# Patient Record
Sex: MALE | Race: WHITE | NOT HISPANIC OR LATINO | URBAN - METROPOLITAN AREA
[De-identification: names, ages, dates, MRNs, and addresses within clinical notes are randomized per-mention and may not be internally consistent; named-entity substitution may affect disease eponyms.]

---

## 2017-08-11 ENCOUNTER — OFFICE VISIT - HEALTHEAST (OUTPATIENT)
Dept: FAMILY MEDICINE | Facility: CLINIC | Age: 57
End: 2017-08-11

## 2017-08-11 DIAGNOSIS — Z00.00 PHYSICAL EXAM: ICD-10-CM

## 2017-08-11 DIAGNOSIS — J30.9 ALLERGIC RHINITIS: ICD-10-CM

## 2017-08-11 DIAGNOSIS — E66.3 OVERWEIGHT: ICD-10-CM

## 2017-08-11 ASSESSMENT — MIFFLIN-ST. JEOR: SCORE: 2002.87

## 2017-08-18 ENCOUNTER — AMBULATORY - HEALTHEAST (OUTPATIENT)
Dept: LAB | Facility: CLINIC | Age: 57
End: 2017-08-18

## 2017-08-18 DIAGNOSIS — Z00.00 PHYSICAL EXAM: ICD-10-CM

## 2017-08-18 DIAGNOSIS — E66.3 OVERWEIGHT: ICD-10-CM

## 2017-08-18 LAB
CHOLEST SERPL-MCNC: 193 MG/DL
FASTING STATUS PATIENT QL REPORTED: YES
HDLC SERPL-MCNC: 44 MG/DL
LDLC SERPL CALC-MCNC: 116 MG/DL
TRIGL SERPL-MCNC: 167 MG/DL

## 2017-08-21 ENCOUNTER — COMMUNICATION - HEALTHEAST (OUTPATIENT)
Dept: FAMILY MEDICINE | Facility: CLINIC | Age: 57
End: 2017-08-21

## 2021-05-31 VITALS — BODY MASS INDEX: 27.87 KG/M2 | HEIGHT: 77 IN | WEIGHT: 236 LBS

## 2021-06-12 NOTE — PROGRESS NOTES
ASSESSMENT:  1. Physical exam  Patient overall has good health habits  - Lipid Cascade  - Glucose    2. Allergic rhinitis  Patient has symptoms essentially year round generally controlled with over-the-counter antihistamines.    3. Overweight  Weight has been stable.      PLAN:  1.  Future labs as above.  2.  Patient otherwise to maintain good healthy habits and should be seen yearly.      Orders Placed This Encounter   Procedures     Lipid Cascade     Order Specific Question:   Fasting is required?     Answer:   Yes     Glucose     Medications Discontinued During This Encounter   Medication Reason     acetaminophen (TYLENOL) 325 MG tablet Therapy completed     ibuprofen (ADVIL,MOTRIN) 200 MG tablet Therapy completed       No Follow-up on file.    CHIEF COMPLAINT:  Chief Complaint   Patient presents with     Annual Exam       SUBJECTIVE:  Seven is a 56 y.o. male who presents to the clinic with concerns for annual exam. He denies any significant changes over the last year.    REVIEW OF SYSTEMS:   He had some right lumbar radiculopathy that was found September 2015 after new onset back pain, diagnosed via MRI. He no longer has any pain but notes intermittent numbness down his leg into his foot. He is standing more at work to compensate and he can no longer bike. He has hay fever and allergies, he is taking 10 mg Claritin almost year round. His allergy symptoms are best in the summer. He is wearing glasses and sees the eye doctor about yearly. He has some high frequency hearing loss. All other systems are negative.    PFSH:  Immunization History   Administered Date(s) Administered     Influenza [id number], 01/11/2010     Influenza, Seasonal, Inj PF 09/27/2010     Influenza, inj, historic 10/13/2015     Td, historic 05/12/1998     Tdap 01/01/2008     Social History     Social History     Marital status:      Spouse name: N/A     Number of children: 1     Years of education: N/A     Occupational History      "[employer]     Social History Main Topics     Smoking status: Never Smoker     Smokeless tobacco: Never Used     Alcohol use 6.0 - 8.4 oz/week     10 - 14 Standard drinks or equivalent per week     Drug use: No     Sexual activity: Yes     Partners: Female     Other Topics Concern     Not on file     Social History Narrative    Diet- Should eat more vegetables.        Exercise- Walking, can no longer bike due to radiculopathy.      History reviewed. No pertinent past medical history.  Family History   Problem Relation Age of Onset     Hypertension Mother      also two sisters     Diabetes type II Paternal Uncle      Hypertension Sister      Hypertension Sister        MEDICATIONS:  Current Outpatient Prescriptions   Medication Sig Dispense Refill     diphenhydrAMINE (BENADRYL) 50 MG tablet Take 50 mg by mouth bedtime as needed for itching.       loratadine (CLARITIN) 10 mg tablet Take 10 mg by mouth daily.       No current facility-administered medications for this visit.        TOBACCO USE:  History   Smoking Status     Never Smoker   Smokeless Tobacco     Never Used       VITALS:  Vitals:    08/11/17 1505 08/11/17 1522   BP: 122/90 118/80   Pulse: 80    Weight: (!) 236 lb (107 kg)    Height: 6' 5\" (1.956 m)      Wt Readings from Last 3 Encounters:   08/11/17 (!) 236 lb (107 kg)   11/02/15 (!) 229 lb (103.9 kg)   09/17/15 (!) 231 lb (104.8 kg)       PHYSICAL EXAM:  Constitutional:   Reveals an alert, healthy appearing male.  Vitals: per nursing notes.  HEENT: Right Ear: External ear normal.   Left Ear: External ear normal.   Nose: Nose normal.   Mouth/Throat: Oropharynx is clear and moist.   Eyes: Conjunctivae and EOM are normal. Pupils are equal, round, and reactive to light. Right eye exhibits no discharge. Left eye exhibits no discharge.   Neck:  Supple, no carotid bruits or adenopathy.  Back:  No spine or CVA pain.  Thorax:  No bony deformities.  Lungs: Clear to A&P without rales or wheezes.  Respiratory effort " normal.  Cardiac:   Regular rate and rhythm, normal S1, S2, no murmur or gallop.  Extremities:   No peripheral edema, pulses in the feet intact.    Skin:  No jaundice, peripheral cyanosis or lesions to suggest malignancy.  Neuro:  Alert and oriented. Cranial nerves, motor, sensory exams are intact.  No gross focal deficits.  Psychiatric:  Memory intact, mood appropriate.    DATA REVIEWED:  Additional History from Old Records Summarized (2): Reviewed Physical from 6/12/2015 regarding general wellness. Reviewed Office Note from 11/2/2015 regarding right lumbar radiculopathy.   Decision to Obtain Records (1): None  Radiology Tests Summarized or Ordered (1): None  Labs Reviewed or Ordered (1): Labs ordered- future.   Medicine Test Summarized or Ordered (1): None  Independent Review of EKG, X-RAY, or RAPID STREP (2 each): None    The visit lasted a total of 9 minutes face to face with the patient. Over 50% of the time was spent counseling and educating the patient about annual wellness.    I, Joana Sanchez, am scribing for and in the presence of, Dr. Joyner.    I, Dr. Joyner, personally performed the services described in this documentation, as scribed by Joana Sanchez in my presence, and it is both accurate and complete.

## 2021-07-03 NOTE — ADDENDUM NOTE
Addendum Note by Prema Lopez at 8/14/2017  8:38 AM     Author: Prema Lopez Service: -- Author Type:     Filed: 8/14/2017  8:38 AM Encounter Date: 8/11/2017 Status: Signed    : Prema Lopez ()    Addended by: PREMA LOPEZ on: 8/14/2017 08:38 AM        Modules accepted: Orders